# Patient Record
Sex: FEMALE | URBAN - NONMETROPOLITAN AREA
[De-identification: names, ages, dates, MRNs, and addresses within clinical notes are randomized per-mention and may not be internally consistent; named-entity substitution may affect disease eponyms.]

---

## 2020-01-22 ENCOUNTER — APPOINTMENT (RX ONLY)
Dept: URBAN - NONMETROPOLITAN AREA CLINIC 6 | Facility: CLINIC | Age: 85
Setting detail: DERMATOLOGY
End: 2020-01-22

## 2020-01-22 DIAGNOSIS — L21.8 OTHER SEBORRHEIC DERMATITIS: ICD-10-CM

## 2020-01-22 PROBLEM — L89.321 PRESSURE ULCER OF LEFT BUTTOCK, STAGE 1: Status: ACTIVE | Noted: 2020-01-22

## 2020-01-22 PROCEDURE — 99201: CPT

## 2020-01-22 PROCEDURE — ? PRESCRIPTION

## 2020-01-22 PROCEDURE — ? COUNSELING

## 2020-01-22 PROCEDURE — ? TREATMENT REGIMEN

## 2020-01-22 RX ORDER — HYDROCORTISONE VALERATE 2 MG/G
CREAM TOPICAL
Qty: 1 | Refills: 3 | Status: ERX | COMMUNITY
Start: 2020-01-22

## 2020-01-22 RX ADMIN — HYDROCORTISONE VALERATE: 2 CREAM TOPICAL at 00:00

## 2020-01-22 ASSESSMENT — LOCATION DETAILED DESCRIPTION DERM: LOCATION DETAILED: RIGHT CENTRAL MALAR CHEEK

## 2020-01-22 ASSESSMENT — LOCATION ZONE DERM: LOCATION ZONE: FACE

## 2020-01-22 ASSESSMENT — LOCATION SIMPLE DESCRIPTION DERM: LOCATION SIMPLE: RIGHT CHEEK

## 2020-01-22 NOTE — HPI: RASH
What Type Of Note Output Would You Prefer (Optional)?: Standard Output
How Severe Is Your Rash?: moderate
Is This A New Presentation, Or A Follow-Up?: Rash
Additional History: Patient reports noting a “sore” on her left buttock for the past several months.  She denies pain, but states it feels rough.  She sleeps in a recliner and as tried a pillow to offset the pressure.  She also applied a thin layer of OTC hydrocortisone cream without much improvement.  She denies a h/o diabetes or other non-healing wounds.  She showers 2 times per week, uses personal care wipes the other days.  She lives independently at the [facility].  No diarrhea, but she has occasional urinary incontinence.
Additional History: Patient reports some itchy, red patches on her face that started a few months ago.  This seemed to start after she used a different face cream.  It has been persistent, but improved slightly after using an OTC steroid cream.  She has not had anything similar in the past.  It affects the eyebrows, nasal creases and near her mouth.  She has also seen it near the upper hairline.  She thinks her hairspray might have changed as well, which seemed to irritate it.  She has since turned back to her original hairspray.  She has avoided makeup and washes with a gentle fragrance free cleanser.  She uses Olay Regenerist at bedtime, but has been holding on that the past few weeks.

## 2020-01-22 NOTE — PROCEDURE: TREATMENT REGIMEN
Otc Regimen: Dove sensitive skin wash to area daily
Discontinue Regimen: Wipes
Plan: Patient will use a pressure-relieving pillow, egg crate or donut to aid in healing of the area.
Initiate Treatment: Duoderm bandage to area changing every 3 days
Detail Level: Zone
Samples Given: Zbar soap, vanicream cleanser and moisturizer
Initiate Treatment: Zinc bar soap once daily \\nHydrocortisone valerate cream AM and PM for one week on and one week off, repeat as needed

## 2020-01-22 NOTE — PROCEDURE: MIPS QUALITY
Quality 226: Preventive Care And Screening: Tobacco Use: Screening And Cessation Intervention: Patient screened for tobacco use and is an ex/non-smoker
Detail Level: Zone
Quality 111:Pneumonia Vaccination Status For Older Adults: Pneumococcal Vaccination Previously Received
Quality 431: Preventive Care And Screening: Unhealthy Alcohol Use - Screening: Patient screened for unhealthy alcohol use using a single question and scores less than 2 times per year

## 2020-02-12 ENCOUNTER — APPOINTMENT (RX ONLY)
Dept: URBAN - NONMETROPOLITAN AREA CLINIC 6 | Facility: CLINIC | Age: 85
Setting detail: DERMATOLOGY
End: 2020-02-12

## 2020-02-12 DIAGNOSIS — L21.8 OTHER SEBORRHEIC DERMATITIS: ICD-10-CM

## 2020-02-12 PROBLEM — L89.321 PRESSURE ULCER OF LEFT BUTTOCK, STAGE 1: Status: ACTIVE | Noted: 2020-02-12

## 2020-02-12 PROCEDURE — ? COUNSELING

## 2020-02-12 PROCEDURE — 99213 OFFICE O/P EST LOW 20 MIN: CPT

## 2020-02-12 PROCEDURE — ? TREATMENT REGIMEN

## 2020-02-12 ASSESSMENT — LOCATION DETAILED DESCRIPTION DERM: LOCATION DETAILED: RIGHT CENTRAL MALAR CHEEK

## 2020-02-12 ASSESSMENT — LOCATION ZONE DERM: LOCATION ZONE: FACE

## 2020-02-12 ASSESSMENT — LOCATION SIMPLE DESCRIPTION DERM: LOCATION SIMPLE: RIGHT CHEEK

## 2020-02-12 NOTE — PROCEDURE: TREATMENT REGIMEN
Plan: Continue with fragrance free skin care products
Discontinue Regimen: Duoderm bandage to area changing every 3 days
Continue Regimen: Zinc bar soap once daily \\nHydrocortisone valerate cream AM and PM for one week on and one week off, repeat as needed\\nVanicream lotion to face BID PRN
Continue Regimen: Pressure relieving measure cushion while sitting in recliner, sleeping in bed at night as tolerated
Detail Level: Zone
Plan: Continue with fragrance free products
Otc Regimen: Vasaline to nostrils PRN
Initiate Treatment: Ana’s butt paste to buttocks BID and PRN
Otc Regimen: Dove sensitive skin wash to area daily